# Patient Record
Sex: MALE | Race: WHITE | NOT HISPANIC OR LATINO
[De-identification: names, ages, dates, MRNs, and addresses within clinical notes are randomized per-mention and may not be internally consistent; named-entity substitution may affect disease eponyms.]

---

## 2022-01-12 PROBLEM — Z00.00 ENCOUNTER FOR PREVENTIVE HEALTH EXAMINATION: Status: ACTIVE | Noted: 2022-01-12

## 2022-01-21 ENCOUNTER — NON-APPOINTMENT (OUTPATIENT)
Age: 62
End: 2022-01-21

## 2022-01-25 ENCOUNTER — NON-APPOINTMENT (OUTPATIENT)
Age: 62
End: 2022-01-25

## 2022-01-25 ENCOUNTER — APPOINTMENT (OUTPATIENT)
Dept: UROLOGY | Facility: CLINIC | Age: 62
End: 2022-01-25
Payer: COMMERCIAL

## 2022-01-25 VITALS — DIASTOLIC BLOOD PRESSURE: 80 MMHG | HEART RATE: 96 BPM | SYSTOLIC BLOOD PRESSURE: 150 MMHG

## 2022-01-25 VITALS — BODY MASS INDEX: 29.82 KG/M2 | HEIGHT: 67 IN | WEIGHT: 190 LBS | TEMPERATURE: 97.1 F

## 2022-01-25 DIAGNOSIS — Z80.6 FAMILY HISTORY OF LEUKEMIA: ICD-10-CM

## 2022-01-25 DIAGNOSIS — M19.90 UNSPECIFIED OSTEOARTHRITIS, UNSPECIFIED SITE: ICD-10-CM

## 2022-01-25 DIAGNOSIS — Z87.01 PERSONAL HISTORY OF PNEUMONIA (RECURRENT): ICD-10-CM

## 2022-01-25 DIAGNOSIS — Z78.9 OTHER SPECIFIED HEALTH STATUS: ICD-10-CM

## 2022-01-25 DIAGNOSIS — Z80.49 FAMILY HISTORY OF MALIGNANT NEOPLASM OF OTHER GENITAL ORGANS: ICD-10-CM

## 2022-01-25 DIAGNOSIS — E11.9 TYPE 2 DIABETES MELLITUS W/OUT COMPLICATIONS: ICD-10-CM

## 2022-01-25 DIAGNOSIS — Z87.891 PERSONAL HISTORY OF NICOTINE DEPENDENCE: ICD-10-CM

## 2022-01-25 PROCEDURE — 99204 OFFICE O/P NEW MOD 45 MIN: CPT

## 2022-01-25 NOTE — ASSESSMENT
[FreeTextEntry1] : Patient with PSA 4.6 in 11/2021 and 6 in 12/2021I spent this consultation discussing the implications of an elevated PSA including the fact that the PSA level may be affected by various factors including age, prostate size, ethnicity, use of medications, and concurrent prostatic inflammation. Serum PSA is generally proportional to the risk of prostate cancer but there is no specific PSA cut-off signifying prostate cancer. I have recommended a prostate MRI with possible prostate biopsy depending on the result.\par \par I had a lengthy discussion with the patient regarding the various causes of erectile dysfunction. We have reviewed his past medical history, surgical history, and medications and discussed its effects on erectile function. We reviewed laboratory results that are pertinent to erectile function. I have stressed the importance on focusing on overall health in order to preserve erectile function, as it is merely a mirror image of overall health, physical and emotional.\par \par Treatment wise, we specifically discussed oral PDE5 inhibitors, vacuum erection devices, transurethral PGE1, intercavernosal injections, and penile prosthesis implantation. We finally discussed the cardiovascular implications (endothelial dysfunction) of a diagnosis of erectile dysfunction in that there is a higher risk of underlying, sometimes clinically occult cardiovascular disease, which may be unmasked with a stress evaluation.\par  - Prostate MRI\par  - Cialis 10 mg QD, emphasized importance of timing when taking medication\par \par

## 2022-01-25 NOTE — LETTER BODY
[Dear  ___] : Dear  [unfilled], [Courtesy Letter:] : I had the pleasure of seeing your patient, [unfilled], in my office today. [Please see my note below.] : Please see my note below. [Consult Closing:] : Thank you very much for allowing me to participate in the care of this patient.  If you have any questions, please do not hesitate to contact me. [Sincerely,] : Sincerely, [FreeTextEntry3] : Kyree Ordoñez MD

## 2022-01-25 NOTE — HISTORY OF PRESENT ILLNESS
[FreeTextEntry1] : 61 year old man with diabetes and arthritis presents with elevated PSA and ED.\par \par PSA was 4.6 in 11/2021, up to 6 in 12/2021. No prior prostate MRI or prostate biopsy. No family history of prostate cancer. Father had leukemia, mother had uterine cancer. Recent intentional 30 pound weight loss with change in diet. \par \par He has no lower urinary tract symptoms.\par \par He has ED that has worsened in the last year, not responsive to cialis 5 mg prn. Erections not strong enough for intercourse. Able to reach orgasm.

## 2022-01-28 ENCOUNTER — RESULT CHARGE (OUTPATIENT)
Age: 62
End: 2022-01-28

## 2022-01-31 LAB
BILIRUB UR QL STRIP: NORMAL
CLARITY UR: CLEAR
COLLECTION METHOD: NORMAL
GLUCOSE UR-MCNC: NORMAL
HCG UR QL: 0.2 EU/DL
HGB UR QL STRIP.AUTO: NORMAL
KETONES UR-MCNC: NORMAL
LEUKOCYTE ESTERASE UR QL STRIP: NORMAL
NITRITE UR QL STRIP: NORMAL
PH UR STRIP: 5.5
PROT UR STRIP-MCNC: NORMAL
SP GR UR STRIP: 1.03

## 2022-02-10 ENCOUNTER — NON-APPOINTMENT (OUTPATIENT)
Age: 62
End: 2022-02-10

## 2023-01-18 ENCOUNTER — APPOINTMENT (OUTPATIENT)
Dept: UROLOGY | Facility: CLINIC | Age: 63
End: 2023-01-18
Payer: COMMERCIAL

## 2023-01-18 VITALS
DIASTOLIC BLOOD PRESSURE: 72 MMHG | HEART RATE: 81 BPM | HEIGHT: 67 IN | TEMPERATURE: 97 F | OXYGEN SATURATION: 100 % | BODY MASS INDEX: 25.11 KG/M2 | WEIGHT: 160 LBS | SYSTOLIC BLOOD PRESSURE: 145 MMHG

## 2023-01-18 DIAGNOSIS — R97.20 ELEVATED PROSTATE, SPECIFIC ANTIGEN [PSA]: ICD-10-CM

## 2023-01-18 DIAGNOSIS — N52.9 MALE ERECTILE DYSFUNCTION, UNSPECIFIED: ICD-10-CM

## 2023-01-18 PROCEDURE — 99214 OFFICE O/P EST MOD 30 MIN: CPT

## 2023-01-18 PROCEDURE — 81003 URINALYSIS AUTO W/O SCOPE: CPT | Mod: QW

## 2023-01-18 PROCEDURE — 51798 US URINE CAPACITY MEASURE: CPT

## 2023-01-18 NOTE — ASSESSMENT
[FreeTextEntry1] : Patient with PSA 4.6 in 11/2021 and 6 in 12/2021, negative prostate MRI with 36 cc gland in 2/2022. \par \par We discussed ongoing ED and available treatments. He is not interested in ICI or transurethral PGE 1 at this time. Will continue with PDE5 inhibitors at higher dose. \par  - F/U PSA\par  - Cialis prn\par

## 2023-01-18 NOTE — HISTORY OF PRESENT ILLNESS
[FreeTextEntry1] : 61 year old man with diabetes and arthritis presents with elevated PSA and ED.\par \par PSA was 4.6 in 11/2021, up to 6 in 12/2021.\par \par Prostate MRI 2/7/22: 36 cc, no concerning lesions.\par \par He has no lower urinary tract symptoms.\par \par IPSS 0, PVR 0\par \par He has ED that has worsened in the last year, not responsive to cialis 5 mg prn. Erections not strong enough for intercourse. Able to reach orgasm.

## 2023-01-19 LAB
BILIRUB UR QL STRIP: NORMAL
CLARITY UR: CLEAR
COLLECTION METHOD: NORMAL
GLUCOSE UR-MCNC: NORMAL
HCG UR QL: 0.2 EU/DL
HGB UR QL STRIP.AUTO: NORMAL
KETONES UR-MCNC: NORMAL
LEUKOCYTE ESTERASE UR QL STRIP: NORMAL
NITRITE UR QL STRIP: NORMAL
PH UR STRIP: 5.5
PROT UR STRIP-MCNC: NORMAL
PSA FREE FLD-MCNC: 25 %
PSA FREE SERPL-MCNC: 1.62 NG/ML
PSA SERPL-MCNC: 6.38 NG/ML
SP GR UR STRIP: 1.02